# Patient Record
Sex: FEMALE | Race: WHITE | NOT HISPANIC OR LATINO | Employment: OTHER | URBAN - METROPOLITAN AREA
[De-identification: names, ages, dates, MRNs, and addresses within clinical notes are randomized per-mention and may not be internally consistent; named-entity substitution may affect disease eponyms.]

---

## 2018-12-19 ENCOUNTER — OFFICE VISIT (OUTPATIENT)
Dept: URGENT CARE | Age: 78
End: 2018-12-19
Payer: MEDICARE

## 2018-12-19 VITALS
RESPIRATION RATE: 16 BRPM | SYSTOLIC BLOOD PRESSURE: 150 MMHG | OXYGEN SATURATION: 96 % | HEIGHT: 61 IN | WEIGHT: 138 LBS | HEART RATE: 88 BPM | BODY MASS INDEX: 26.06 KG/M2 | DIASTOLIC BLOOD PRESSURE: 71 MMHG | TEMPERATURE: 99.1 F

## 2018-12-19 DIAGNOSIS — J06.9 ACUTE RESPIRATORY DISEASE: Primary | ICD-10-CM

## 2018-12-19 DIAGNOSIS — J02.9 SORE THROAT: ICD-10-CM

## 2018-12-19 LAB — S PYO AG THROAT QL: NEGATIVE

## 2018-12-19 PROCEDURE — 87070 CULTURE OTHR SPECIMN AEROBIC: CPT | Performed by: PHYSICIAN ASSISTANT

## 2018-12-19 PROCEDURE — 99213 OFFICE O/P EST LOW 20 MIN: CPT | Performed by: PHYSICIAN ASSISTANT

## 2018-12-19 PROCEDURE — G0463 HOSPITAL OUTPT CLINIC VISIT: HCPCS | Performed by: PHYSICIAN ASSISTANT

## 2018-12-19 PROCEDURE — 87430 STREP A AG IA: CPT | Performed by: PHYSICIAN ASSISTANT

## 2018-12-19 RX ORDER — BROMPHENIRAMINE MALEATE, PSEUDOEPHEDRINE HYDROCHLORIDE, AND DEXTROMETHORPHAN HYDROBROMIDE 2; 30; 10 MG/5ML; MG/5ML; MG/5ML
5 SYRUP ORAL 3 TIMES DAILY PRN
Qty: 120 ML | Refills: 0 | Status: SHIPPED | OUTPATIENT
Start: 2018-12-19

## 2018-12-19 NOTE — PATIENT INSTRUCTIONS
Take Bromfed DM as prescribed  Fluids and rest  Nasal saline spray;   Tylenol/Ibuprofen for pain/fever  Salt water gargles and chloraseptic spray  Throat Coat Tea  Warm compresses over sinuses  Steam treatment (utilize proper safety precautions when in contact with hot water/steam)  Follow up with PCP in 3-5 days  Proceed to  ER if symptoms worsen  Cold Symptoms   WHAT YOU NEED TO KNOW:   A cold is an infection caused by a virus  The infection causes your upper respiratory system to become inflamed  Common symptoms of a cold include sneezing, dry throat, a stuffy nose, headache, watery eyes, and a cough  Your cough may be dry, or you may cough up mucus  You may also have muscle aches, joint pain, and tiredness  Rarely, you may have a fever  Most colds go away without treatment  DISCHARGE INSTRUCTIONS:   Return to the emergency department if:   · You have increased tiredness and weakness  · You are unable to eat  · Your heart is beating much faster than usual for you  · You see white spots in the back of your throat and your neck is swollen and sore to the touch  · You see pinpoint or larger reddish-purple dots on your skin  Contact your healthcare provider if:   · You have a fever higher than 102°F (38 9°C)  · You have new or worsening shortness of breath  · You have thick nasal drainage for more than 2 days  · Your symptoms do not improve or get worse within 5 days  · You have questions or concerns about your condition or care  Medicines: The following medicines may be suggested by your healthcare provider to decrease your cold symptoms  These medicines are available without a doctor's order  Ask which medicines to take and when to take them  Follow directions  · NSAIDs or acetaminophen  help to bring down a fever or decrease pain  · Decongestants  help decrease nasal stuffiness  · Antihistamines  help decrease sneezing and a runny nose       · Cough suppressants  help decrease how much you cough  · Expectorants  help loosen mucus so you can cough it up  · Take your medicine as directed  Contact your healthcare provider if you think your medicine is not helping or if you have side effects  Tell him of her if you are allergic to any medicine  Keep a list of the medicines, vitamins, and herbs you take  Include the amounts, and when and why you take them  Bring the list or the pill bottles to follow-up visits  Carry your medicine list with you in case of an emergency  Symptom relief: The following may help relieve cold symptoms, such as a dry throat and congestion:  · Gargle with mouthwash or warm salt water as directed  · Suck on throat lozenges or hard candy  · Use a cold or warm vaporizer or humidifier to ease your breathing  · Rest for at least 2 days and then as needed to decrease tiredness and weakness  · Use petroleum based jelly around your nostrils to decrease irritation from blowing your nose  Drink liquids:  Liquids will help thin and loosen thick mucus so you can cough it up  Liquids will also keep you hydrated  Ask your healthcare provider which liquids are best for you and how much to drink each day  Prevent the spread of germs: You can spread your cold germs to others for at least 3 days after your symptoms start  Wash your hands often  Do not share items, such as eating utensils  Cover your nose and mouth when you cough or sneeze using the crook of your elbow instead of your hands  Throw used tissues in the garbage  Do not smoke:  Smoking may worsen your symptoms and increase the length of time you feel sick  Talk with your healthcare provider if you need help to stop smoking  Follow up with your healthcare provider as directed:  Write down your questions so you remember to ask them during your visits     © 2017 [employer] Information is for End User's use only and may not be sold, redistributed or otherwise used for commercial purposes  All illustrations and images included in CareNotes® are the copyrighted property of [employer]  or [employer]  The above information is an  only  It is not intended as medical advice for individual conditions or treatments  Talk to your doctor, nurse or pharmacist before following any medical regimen to see if it is safe and effective for you

## 2018-12-19 NOTE — PROGRESS NOTES
[facility]        NAME: Daxa Zuluaga is a [de-identified] y o  female  : 1940    MRN: [id number]  DATE: 2018  TIME: 5:34 PM    Assessment and Plan   Acute respiratory disease [J06 9]  1  Acute respiratory disease  brompheniramine-pseudoephedrine-DM 30-2-10 MG/5ML syrup   2  Sore throat  POCT rapid strepA         Patient Instructions     Take Bromfed DM as prescribed  Fluids and rest  Nasal saline spray;   Tylenol/Ibuprofen for pain/fever  Salt water gargles and chloraseptic spray  Throat Coat Tea  Warm compresses over sinuses  Steam treatment (utilize proper safety precautions when in contact with hot water/steam)  Follow up with PCP in 3-5 days  Proceed to  ER if symptoms worsen  Chief Complaint     Chief Complaint   Patient presents with    Cold Like Symptoms     Pt c/o sore throat, runny nose, and cough x 2 days  History of Present Illness       URI    This is a new problem  Episode onset: 2 days  The problem has been unchanged  There has been no fever  Associated symptoms include coughing, rhinorrhea and a sore throat  Pertinent negatives include no abdominal pain, congestion, diarrhea, ear pain, headaches, nausea, rash, sinus pain, sneezing, vomiting or wheezing  She has tried nothing for the symptoms  The treatment provided no relief  Review of Systems   Review of Systems   Constitutional: Negative for activity change, appetite change, chills, fatigue and fever  HENT: Positive for rhinorrhea and sore throat  Negative for congestion, ear discharge, ear pain, postnasal drip, sinus pain, sinus pressure, sneezing and trouble swallowing  Respiratory: Positive for cough  Negative for chest tightness, shortness of breath and wheezing  Gastrointestinal: Negative for abdominal pain, diarrhea, nausea and vomiting  Musculoskeletal: Negative for myalgias  Skin: Negative for rash  Neurological: Negative for light-headedness and headaches           Current Medications Current Outpatient Prescriptions:     brompheniramine-pseudoephedrine-DM 30-2-10 MG/5ML syrup, Take 5 mL by mouth 3 (three) times a day as needed for cough, Disp: 120 mL, Rfl: 0    Current Allergies     Allergies as of 12/19/2018 - Reviewed 12/19/2018   Allergen Reaction Noted    Ambien [zolpidem] Anxiety 12/19/2018    Shellfish-derived products GI Intolerance 12/19/2018            The following portions of the patient's history were reviewed and updated as appropriate: allergies, current medications, past family history, past medical history, past social history, past surgical history and problem list      History reviewed  No pertinent past medical history  History reviewed  No pertinent surgical history  History reviewed  No pertinent family history  Medications have been verified  Objective   /71   Pulse 88   Temp 99 1 °F (37 3 °C) (Tympanic)   Resp 16   Ht 5' 1" (1 549 m)   Wt 62 6 kg (138 lb)   SpO2 96%   BMI 26 07 kg/m²        Physical Exam     Physical Exam   Constitutional: She is oriented to person, place, and time  She appears well-developed and well-nourished  No distress  HENT:   Head: Normocephalic  Right Ear: External ear normal    Left Ear: External ear normal    Nose: Nose normal    Mouth/Throat: Oropharynx is clear and moist  No oropharyngeal exudate  Eyes: Conjunctivae are normal    Cardiovascular: Normal rate, regular rhythm, normal heart sounds and intact distal pulses  Exam reveals no gallop and no friction rub  No murmur heard  Pulmonary/Chest: Effort normal and breath sounds normal  No respiratory distress  She has no wheezes  She has no rales  She exhibits no tenderness  Lymphadenopathy:     She has no cervical adenopathy  Neurological: She is alert and oriented to person, place, and time  Skin: Skin is warm  She is not diaphoretic  Psychiatric: She has a normal mood and affect   Her behavior is normal  Judgment and thought content normal    Vitals reviewed

## 2018-12-22 LAB — BACTERIA THROAT CULT: NORMAL

## 2024-03-05 ENCOUNTER — TELEPHONE (OUTPATIENT)
Age: 84
End: 2024-03-05

## 2024-03-05 NOTE — TELEPHONE ENCOUNTER
Called, no answer. LVM for patient to give us a call back to ask her more clinical questions to see if we can try to get her in sooner.

## 2024-03-05 NOTE — TELEPHONE ENCOUNTER
Both hands & eyelids are raw looking patient is  in a lot pain would like an appointment ASAP       Please Advise

## 2024-03-11 ENCOUNTER — OFFICE VISIT (OUTPATIENT)
Dept: URGENT CARE | Facility: CLINIC | Age: 84
End: 2024-03-11
Payer: MEDICARE

## 2024-03-11 VITALS
OXYGEN SATURATION: 96 % | SYSTOLIC BLOOD PRESSURE: 122 MMHG | RESPIRATION RATE: 20 BRPM | HEIGHT: 62 IN | BODY MASS INDEX: 20.87 KG/M2 | WEIGHT: 113.4 LBS | TEMPERATURE: 97.1 F | HEART RATE: 85 BPM | DIASTOLIC BLOOD PRESSURE: 68 MMHG

## 2024-03-11 DIAGNOSIS — H00.012 HORDEOLUM EXTERNUM OF RIGHT LOWER EYELID: ICD-10-CM

## 2024-03-11 DIAGNOSIS — R21 RASH: Primary | ICD-10-CM

## 2024-03-11 PROCEDURE — 99203 OFFICE O/P NEW LOW 30 MIN: CPT | Performed by: PHYSICIAN ASSISTANT

## 2024-03-11 RX ORDER — TRIAMCINOLONE ACETONIDE 1 MG/ML
LOTION TOPICAL 3 TIMES DAILY
Qty: 60 ML | Refills: 0 | Status: SHIPPED | OUTPATIENT
Start: 2024-03-11

## 2024-03-11 RX ORDER — ERYTHROMYCIN 5 MG/G
0.5 OINTMENT OPHTHALMIC EVERY 6 HOURS SCHEDULED
Qty: 3.5 G | Refills: 0 | Status: SHIPPED | OUTPATIENT
Start: 2024-03-11 | End: 2024-03-16

## 2024-03-11 NOTE — PROGRESS NOTES
[facility]        NAME: Willow Fletcher is a 83 y.o. female  : 1940    MRN: [id number]  DATE: 2024  TIME: 3:42 PM    Assessment and Plan   Rash [R21]  1. Rash  triamcinolone (KENALOG) 0.1 % lotion    Ambulatory Referral to Family Practice      2. Hordeolum externum of right lower eyelid  erythromycin (ILOTYCIN) ophthalmic ointment            Patient Instructions     Patient Instructions   Can apply Kenalog lotion to areas of hands.  Apply erythromycin ointment to right lower lash line.  Also advised warm compresses to affected area of eye.  Provided information to establish care with PCP.      Follow up with PCP in 3-5 days.  Proceed to  ER if symptoms worsen.    Chief Complaint     Chief Complaint   Patient presents with    Rash    Eye Problem     Rash in both hands, eye lid nose and mouth for a couple of month. Rash is itchy, sore and dry. Eye lid peeling and RT. Woke up with Small lump in the eye. Needs ears check for wax.         History of Present Illness       Patient is a 83-year-old female presenting today with rash x 2 months.  Patient has a PMH significant for psoriasis.  Notes over the last few months she has had a persistent red rash of her hands and palms, notes this is not unusual for her with her psoriasis, has tried some over-the-counter lotions but notes no change or improvement of her symptoms.  Denies any discharge or drainage from the areas.  Denies fever.    Patient also notes that yesterday she noticed a small red raised bump of her right lower eyelid, believes it may be a stye, has not tried any medication limiting factors for her symptoms.  Denies eye pain, photophobia, vision changes.        Review of Systems   Review of Systems   Constitutional:  Negative for chills and fever.   HENT:  Negative for ear pain and sore throat.    Eyes:  Negative for photophobia and visual disturbance.        See HPI   Respiratory:  Negative for cough and shortness of breath.   "  Cardiovascular:  Negative for chest pain and palpitations.   Gastrointestinal:  Negative for abdominal pain and vomiting.   Genitourinary:  Negative for dysuria and hematuria.   Musculoskeletal:  Negative for arthralgias and back pain.   Skin:  Negative for color change and rash.   Neurological:  Negative for seizures and syncope.   All other systems reviewed and are negative.        Current Medications       Current Outpatient Medications:     erythromycin (ILOTYCIN) ophthalmic ointment, Administer 0.5 inches to the right eye every 6 (six) hours for 5 days, Disp: 3.5 g, Rfl: 0    triamcinolone (KENALOG) 0.1 % lotion, Apply topically 3 (three) times a day, Disp: 60 mL, Rfl: 0    brompheniramine-pseudoephedrine-DM 30-2-10 MG/5ML syrup, Take 5 mL by mouth 3 (three) times a day as needed for cough (Patient not taking: Reported on 3/11/2024), Disp: 120 mL, Rfl: 0    Current Allergies     Allergies as of 03/11/2024 - Reviewed 03/11/2024   Allergen Reaction Noted    Ambien [zolpidem] Anxiety 12/19/2018    Shellfish-derived products - food allergy GI Intolerance 12/19/2018            The following portions of the patient's history were reviewed and updated as appropriate: allergies, current medications, past family history, past medical history, past social history, past surgical history and problem list.     History reviewed. No pertinent past medical history.    History reviewed. No pertinent surgical history.    No family history on file.      Medications have been verified.        Objective   /68   Pulse 85   Temp (!) 97.1 °F (36.2 °C)   Resp 20   Ht 5' 2\" (1.575 m)   Wt 51.4 kg (113 lb 6.4 oz)   SpO2 96%   BMI 20.74 kg/m²        Physical Exam     Physical Exam  Vitals and nursing note reviewed.   Constitutional:       Appearance: Normal appearance.   HENT:      Head: Normocephalic.      Right Ear: Tympanic membrane, ear canal and external ear normal.      Left Ear: Tympanic membrane, ear canal and " external ear normal.      Nose: Nose normal.      Mouth/Throat:      Mouth: Mucous membranes are moist.      Pharynx: Oropharynx is clear.   Eyes:      General:         Right eye: Hordeolum present. No discharge.         Left eye: No discharge.      Pupils: Pupils are equal, round, and reactive to light.     Cardiovascular:      Rate and Rhythm: Normal rate and regular rhythm.      Pulses: Normal pulses.      Heart sounds: Normal heart sounds.   Pulmonary:      Effort: Pulmonary effort is normal.      Breath sounds: Normal breath sounds.   Skin:     Capillary Refill: Capillary refill takes less than 2 seconds.      Findings: Rash present.      Comments: Red dry scaly rash of hands and palms bilaterally, no signs of infection   Neurological:      Mental Status: She is alert.

## 2024-03-11 NOTE — PATIENT INSTRUCTIONS
Can apply Kenalog lotion to areas of hands.  Apply erythromycin ointment to right lower lash line.  Also advised warm compresses to affected area of eye.  Provided information to establish care with PCP.

## 2024-07-18 ENCOUNTER — IN HOME VISIT (OUTPATIENT)
Age: 84
End: 2024-07-18

## 2024-07-18 VITALS
OXYGEN SATURATION: 93 % | HEART RATE: 58 BPM | RESPIRATION RATE: 16 BRPM | TEMPERATURE: 98.3 F | DIASTOLIC BLOOD PRESSURE: 70 MMHG | SYSTOLIC BLOOD PRESSURE: 120 MMHG

## 2024-07-18 DIAGNOSIS — R26.2 WALKING DIFFICULTY DUE TO MULTIPLE SITES: ICD-10-CM

## 2024-07-18 DIAGNOSIS — R68.89 UNABLE TO STAND UP: ICD-10-CM

## 2024-07-18 DIAGNOSIS — Z00.01 ENCOUNTER FOR GENERAL ADULT MEDICAL EXAMINATION WITH ABNORMAL FINDINGS: ICD-10-CM

## 2024-07-18 DIAGNOSIS — Z13.31 POSITIVE DEPRESSION SCREENING: ICD-10-CM

## 2024-07-18 DIAGNOSIS — Z11.59 ENCOUNTER FOR HEPATITIS C SCREENING TEST FOR LOW RISK PATIENT: Primary | ICD-10-CM

## 2024-07-18 DIAGNOSIS — F32.A MILD DEPRESSION: ICD-10-CM

## 2024-07-18 DIAGNOSIS — M25.512 ACUTE PAIN OF LEFT SHOULDER: ICD-10-CM

## 2024-07-18 DIAGNOSIS — K59.00 CONSTIPATION, UNSPECIFIED CONSTIPATION TYPE: ICD-10-CM

## 2024-07-18 DIAGNOSIS — R52 INTRACTABLE PAIN: ICD-10-CM

## 2024-07-18 DIAGNOSIS — G14 POST-POLIO SYNDROME: ICD-10-CM

## 2024-07-18 DIAGNOSIS — K21.9 GASTROESOPHAGEAL REFLUX DISEASE, UNSPECIFIED WHETHER ESOPHAGITIS PRESENT: ICD-10-CM

## 2024-07-18 DIAGNOSIS — R60.0 EDEMA OF RIGHT FOOT: ICD-10-CM

## 2024-07-18 DIAGNOSIS — R52 GENERALIZED PAIN: ICD-10-CM

## 2024-07-18 PROBLEM — I10 HYPERTENSION: Status: ACTIVE | Noted: 2024-07-18

## 2024-07-18 PROCEDURE — G0438 PPPS, INITIAL VISIT: HCPCS | Performed by: NURSE PRACTITIONER

## 2024-07-18 PROCEDURE — 99344 HOME/RES VST NEW MOD MDM 60: CPT | Performed by: NURSE PRACTITIONER

## 2024-07-18 RX ORDER — ESCITALOPRAM OXALATE 10 MG/1
10 TABLET ORAL DAILY
Qty: 30 TABLET | Refills: 2 | Status: SHIPPED | OUTPATIENT
Start: 2024-07-18

## 2024-07-18 RX ORDER — FENTANYL 12.5 UG/1
1 PATCH TRANSDERMAL
Qty: 30 PATCH | Refills: 0 | Status: SHIPPED | OUTPATIENT
Start: 2024-07-18 | End: 2024-07-20

## 2024-07-18 RX ORDER — SENNA AND DOCUSATE SODIUM 50; 8.6 MG/1; MG/1
1 TABLET, FILM COATED ORAL DAILY
Qty: 90 TABLET | Refills: 1 | Status: SHIPPED | OUTPATIENT
Start: 2024-07-18

## 2024-07-18 RX ORDER — ESCITALOPRAM OXALATE 10 MG/1
10 TABLET ORAL DAILY
Qty: 90 TABLET | Refills: 3 | Status: SHIPPED | OUTPATIENT
Start: 2024-07-18 | End: 2024-07-18

## 2024-07-18 RX ORDER — OMEPRAZOLE 20 MG/1
20 CAPSULE, DELAYED RELEASE ORAL
Qty: 90 CAPSULE | Refills: 3 | Status: SHIPPED | OUTPATIENT
Start: 2024-07-18 | End: 2025-07-13

## 2024-07-18 NOTE — PROGRESS NOTES
Assessment and Plan:     Problem List Items Addressed This Visit       Acute pain of left shoulder    Constipation    Relevant Medications    senna-docusate sodium (SENOKOT-S) 8.6-50 mg per tablet  OTC medication ineffective and starting opioid    Gastroesophageal reflux disease    Relevant Medications    omeprazole (PriLOSEC) 20 mg delayed release capsule  Educated on use      Mild depression    Relevant Medications    escitalopram (LEXAPRO) 10 mg tablet  Refuses further intervention    Generalized pain    Relevant Medications    fentaNYL (DURAGESIC) 12 mcg/hr TD 72 hr patch  Educated to stop motrin and tylenol due to excessive use and ineffective for pain    Intractable pain    Relevant Medications    fentaNYL (DURAGESIC) 12 mcg/hr TD 72 hr patch    Unable to stand up    Walking difficulty due to multiple sites  Referral for PT/OT    Post-polio syndrome - Primary    Relevant Medications    fentaNYL (DURAGESIC) 12 mcg/hr TD 72 hr patch    Other Relevant Orders    CBC and differential    Comprehensive metabolic panel    Edema of right foot  Not treatment at this time-educated daughters to monitor for edema    Encounter for general adult medical examination with abnormal findings     Other Visit Diagnoses       Positive depression screening        Relevant Medications    escitalopram (LEXAPRO) 10 mg tablet  Denies suicidal ideation              Depression Screening and Follow-up Plan: Patient's depression screening was positive with a PHQ-9 score of 5. Patient assessed for underlying major depression. Brief counseling provided and recommend additional follow-up/re-evaluation next office visit. Patient declines further evaluation by mental health professional and/or medications. Brief counseling provided. Will re-evaluate at next office visit. Patient with underlying depression and was advised to continue current medications as prescribed. Depression likely due to other medical condition. Will treat underlying  condition.       Preventive health issues were discussed with patient, and age appropriate screening tests were ordered as noted in patient's After Visit Summary.  Personalized health advice and appropriate referrals for health education or preventive services given if needed, as noted in patient's After Visit Summary.     History of Present Illness:     Patient presents for a Medicare Wellness Visit    Patient seen today due to request from daughter.  Patient has intractable pain due to post polio syndrome.  Currently unable to stand and walk with max assist.  She is resisting urge to eat and drink so she does not have to use toilet.  Requires skilled nursing and PT/OT for medication observation and teaching, inability to stand and walk at present and babak of right arm and current weakness and pain in left arm.  Willow refuses hospitalization.  Patient given POLST and is completing with her daughters.  Wheelchair and commode ordered.  Daughters assist patient with all care at present.  > 60 minutes spent with patient on detailed history and physical, planning and diagnosing,, education and attempted blood draw.  AWV completed.       No care team member to display     Review of Systems:     Review of Systems   Constitutional:  Positive for appetite change.   HENT: Negative.     Eyes: Negative.    Respiratory:  Positive for shortness of breath.         SOB when exerting   Cardiovascular:  Positive for leg swelling.        Right foot edema   Gastrointestinal:  Positive for constipation.   Endocrine: Negative.    Genitourinary:         Trying not to eat or drink much due to inability to get to toilet   Musculoskeletal:  Positive for back pain, gait problem, joint swelling and myalgias.   Skin: Negative.    Allergic/Immunologic: Negative.    Neurological:  Positive for weakness.   Hematological: Negative.    Psychiatric/Behavioral: Negative.        Problem List:     Patient Active Problem List   Diagnosis    Acute pain  "of left shoulder    Constipation    Gastroesophageal reflux disease    Mild depression    Generalized pain    Intractable pain    Unable to stand up    Walking difficulty due to multiple sites    Post-polio syndrome    Edema of right foot    Hypertension    Encounter for general adult medical examination with abnormal findings      Past Medical and Surgical History:     History reviewed. No pertinent past medical history.  History reviewed. No pertinent surgical history.   Family History:     History reviewed. No pertinent family history.   Social History:     Social History     Socioeconomic History    Marital status: Single     Spouse name: None    Number of children: None    Years of education: None    Highest education level: None   Occupational History    None   Tobacco Use    Smoking status: Former    Smokeless tobacco: Never    Tobacco comments:     Does not smoke   Substance and Sexual Activity    Alcohol use: Yes     Comment: \"a glass of whine occasionally\"    Drug use: No    Sexual activity: Not Currently   Other Topics Concern    None   Social History Narrative    None     Social Determinants of Health     Financial Resource Strain: Not on file   Food Insecurity: Not on file   Transportation Needs: Not on file   Physical Activity: Not on file   Stress: Not on file   Social Connections: Not on file   Intimate Partner Violence: Not on file   Housing Stability: Not on file      Medications and Allergies:     Current Outpatient Medications   Medication Sig Dispense Refill    escitalopram (LEXAPRO) 10 mg tablet Take 1 tablet (10 mg total) by mouth daily 30 tablet 2    fentaNYL (DURAGESIC) 12 mcg/hr TD 72 hr patch Place 1 patch on the skin over 72 hours every third day Max Daily Amount: 1 patch 30 patch 0    omeprazole (PriLOSEC) 20 mg delayed release capsule Take 1 capsule (20 mg total) by mouth daily before breakfast 90 capsule 3    senna-docusate sodium (SENOKOT-S) 8.6-50 mg per tablet Take 1 tablet by mouth " daily 90 tablet 1    triamcinolone (KENALOG) 0.1 % lotion Apply topically 3 (three) times a day 60 mL 0     No current facility-administered medications for this visit.     Allergies   Allergen Reactions    Ambien [Zolpidem] Anxiety    Shellfish-Derived Products - Food Allergy GI Intolerance      Immunizations:       There is no immunization history on file for this patient.   Health Maintenance:     There are no preventive care reminders to display for this patient.  There are no preventive care reminders to display for this patient.     Medicare Screening Tests and Risk Assessments:         Health Risk Assessment:   Patient rates overall health as poor. Patient feels that their physical health rating is much worse. Patient is satisfied with their life. Eyesight was rated as same. Hearing was rated as same. Patient feels that their emotional and mental health rating is slightly worse. Patients states they are never, rarely angry. Patient states they are sometimes unusually tired/fatigued. Pain experienced in the last 7 days has been a lot. Patient's pain rating has been 10/10. Patient states that she has experienced no weight loss or gain in last 6 months.     Depression Screening:   PHQ-9 Score: 5      Fall Risk Screening:   In the past year, patient has experienced: no history of falling in past year      Urinary Incontinence Screening:   Patient has not leaked urine accidently in the last six months.     Home Safety:  Patient has trouble with stairs inside or outside of their home. Patient has working smoke alarms and has working carbon monoxide detector. Home safety hazards include: none.     Nutrition:   Current diet is Regular.     Medications:   Patient is not currently taking any over-the-counter supplements. Patient is able to manage medications. Educated to stop using tylenol and motrin which are ineffective for pain    Activities of Daily Living (ADLs)/Instrumental Activities of Daily Living (IADLs):    Walk and transfer into and out of bed and chair?: No  Dress and groom yourself?: No    Bathe or shower yourself?: No    Feed yourself? Yes  Do your laundry/housekeeping?: No  Manage your money, pay your bills and track your expenses?: Yes  Make your own meals?: No    Do your own shopping?: No    ADL comments: Currently dependent for care by daughters    Previous Hospitalizations:   Any hospitalizations or ED visits within the last 12 months?: No      Advance Care Planning:   Living will: No    Durable POA for healthcare: No    Advanced directive: No    Advanced directive counseling given: Yes    Five wishes given: No    Patient declined ACP directive: No    End of Life Decisions reviewed with patient: Yes    Provider agrees with end of life decisions: Yes      Comments: Patient currently working on POLST form.      Cognitive Screening:   Provider or family/friend/caregiver concerned regarding cognition?: No    PREVENTIVE SCREENINGS      Cardiovascular Screening:    General: Patient Declines      Diabetes Screening:     General: Patient Declines      Colorectal Cancer Screening:     General: Screening Not Indicated      Breast Cancer Screening:     General: Patient Declines      Cervical Cancer Screening:    General: Screening Not Indicated      Osteoporosis Screening:    General: Patient Declines      Abdominal Aortic Aneurysm (AAA) Screening:        General: Patient Declines      Lung Cancer Screening:     General: Screening Not Indicated      Hepatitis C Screening:      Hep C Screening Accepted: Yes      Screening, Brief Intervention, and Referral to Treatment (SBIRT)    Screening  Typical number of drinks in a day: 0  Typical number of drinks in a week: 0  Interpretation: Low risk drinking behavior.    Review of Current Opioid Use    Opioid Risk Tool (ORT) Interpretation: Complete Opioid Risk Tool (ORT)    [place] PDMP or [place]  reviewed. No red flags were identified    Patient started on opiod.  Per patient motrin  and tylenol for intractable generalized pain due to post polio syndrome.    Other Counseling Topics:   PT/OT needed    No results found.     Physical Exam:     /70   Pulse 58   Temp 98.3 °F (36.8 °C)   Resp 16   SpO2 93%     Physical Exam  Constitutional:       General: She is in acute distress.      Appearance: Normal appearance. She is normal weight. She is not ill-appearing, toxic-appearing or diaphoretic.   HENT:      Head: Normocephalic and atraumatic.      Right Ear: External ear normal.      Left Ear: External ear normal.      Nose: Nose normal. No congestion.      Mouth/Throat:      Mouth: Mucous membranes are moist.   Eyes:      General:         Right eye: No discharge.         Left eye: No discharge.      Conjunctiva/sclera: Conjunctivae normal.   Cardiovascular:      Rate and Rhythm: Normal rate and regular rhythm.      Heart sounds: Normal heart sounds.   Pulmonary:      Effort: No respiratory distress.      Breath sounds: No wheezing, rhonchi or rales.   Abdominal:      General: Bowel sounds are normal.      Palpations: Abdomen is soft.      Tenderness: There is no abdominal tenderness. There is no guarding.      Hernia: No hernia is present.   Musculoskeletal:         General: Normal range of motion.      Cervical back: Normal range of motion. No rigidity.      Right lower leg: Edema present.      Left lower leg: No edema.   Lymphadenopathy:      Cervical: No cervical adenopathy.   Skin:     General: Skin is warm and dry.   Neurological:      General: No focal deficit present.      Mental Status: She is alert and oriented to person, place, and time.      Sensory: Sensory deficit present.      Motor: Weakness present.      Gait: Gait abnormal.      Comments: Right arm not functioning-has learned to use left hand which is now weak   Psychiatric:         Behavior: Behavior normal.         Thought Content: Thought content normal.         Judgment: Judgment normal.      Comments: Mild depression           Ashley Plascencia CRNPDepression Screening Follow-up Plan: Patient's depression screening was positive with a PHQ-2 score of . Their PHQ-9 score was 5. Patient assessed for underlying major depression. They have no active suicidal ideations. Brief counseling provided and recommend additional follow-up/re-evaluation next office visit. Patient declines further evaluation by mental health professional and/or medications. They have no active suicidal ideations. Brief counseling provided and recommend additional follow-up/re-evaluation at next office visit. Patient's depressive symptoms likely due to other medical condition. Would recommend treatment of underlying condition. Will continue to monitor at next office visit.

## 2024-07-20 DIAGNOSIS — R52 INTRACTABLE PAIN: Primary | ICD-10-CM

## 2024-07-20 RX ORDER — FENTANYL 50 UG/1
1 PATCH TRANSDERMAL
Qty: 5 PATCH | Refills: 0 | Status: SHIPPED | OUTPATIENT
Start: 2024-07-20

## 2024-07-20 NOTE — PROGRESS NOTES
Patient is not having pain control with 12mcg-3 patches on.  Increase dose.  Patient is referred to hospice.

## 2024-07-22 ENCOUNTER — PATIENT OUTREACH (OUTPATIENT)
Age: 84
End: 2024-07-22

## 2024-07-22 DIAGNOSIS — Z51.5 END OF LIFE CARE: ICD-10-CM

## 2024-07-22 DIAGNOSIS — G14 POST-POLIO SYNDROME: Primary | ICD-10-CM

## 2024-07-22 DIAGNOSIS — R26.2 WALKING DIFFICULTY DUE TO MULTIPLE SITES: ICD-10-CM

## 2024-07-22 DIAGNOSIS — R68.89 UNABLE TO STAND UP: ICD-10-CM

## 2024-07-22 NOTE — PROGRESS NOTES
Received call from Karine Alvarado [facility] . Pt is a new referral from Tuesday Petrasjonny TODD.    [facility] Tuesday requested that orders for [facility] be placed to [facility].     Order placed as requested.

## 2024-07-22 NOTE — PROGRESS NOTES
Received call from Karine [facility]. Karine is requesting that DME order be cancelled since patient is going on hospice.     Outreach to [facility]. DME order for commode and wheelchair canceled.     LVM for Karine regarding above.

## 2024-07-24 ENCOUNTER — TELEPHONE (OUTPATIENT)
Age: 84
End: 2024-07-24

## 2024-08-09 ENCOUNTER — TELEPHONE (OUTPATIENT)
Age: 84
End: 2024-08-09

## 2024-08-09 NOTE — TELEPHONE ENCOUNTER
Hospice Plan of Care 7/22/24-10/19/24  Scanned into encounter  Placed in pcps folder  [facility] nurse will  when completed

## 2024-08-09 NOTE — TELEPHONE ENCOUNTER
"Copies made and placed in to be scanned.     Originals placed in \"[facility]\" bin for in person .     "

## 2024-12-04 ENCOUNTER — TELEPHONE (OUTPATIENT)
Age: 84
End: 2024-12-04